# Patient Record
Sex: MALE | Race: WHITE | NOT HISPANIC OR LATINO | Employment: FULL TIME | URBAN - METROPOLITAN AREA
[De-identification: names, ages, dates, MRNs, and addresses within clinical notes are randomized per-mention and may not be internally consistent; named-entity substitution may affect disease eponyms.]

---

## 2024-08-02 ENCOUNTER — OFFICE VISIT (OUTPATIENT)
Dept: URGENT CARE | Facility: CLINIC | Age: 24
End: 2024-08-02
Payer: OTHER GOVERNMENT

## 2024-08-02 VITALS
OXYGEN SATURATION: 96 % | BODY MASS INDEX: 31.07 KG/M2 | RESPIRATION RATE: 18 BRPM | SYSTOLIC BLOOD PRESSURE: 113 MMHG | HEIGHT: 68 IN | DIASTOLIC BLOOD PRESSURE: 76 MMHG | TEMPERATURE: 99 F | HEART RATE: 96 BPM | WEIGHT: 205 LBS

## 2024-08-02 DIAGNOSIS — J02.9 SORE THROAT: ICD-10-CM

## 2024-08-02 DIAGNOSIS — H57.89 REDNESS OF RIGHT EYE: ICD-10-CM

## 2024-08-02 DIAGNOSIS — H10.32 ACUTE BACTERIAL CONJUNCTIVITIS OF LEFT EYE: Primary | ICD-10-CM

## 2024-08-02 PROBLEM — I49.8 OTHER SPECIFIED CARDIAC ARRHYTHMIAS: Status: ACTIVE | Noted: 2024-08-02

## 2024-08-02 PROBLEM — U07.1 DISEASE DUE TO SEVERE ACUTE RESPIRATORY SYNDROME CORONAVIRUS 2 (SARS-COV-2): Status: ACTIVE | Noted: 2023-12-20

## 2024-08-02 PROBLEM — G47.00 INSOMNIA: Status: ACTIVE | Noted: 2024-06-21

## 2024-08-02 PROBLEM — R07.81 PLEURODYNIA: Status: ACTIVE | Noted: 2024-04-03

## 2024-08-02 LAB
CTP QC/QA: YES
MOLECULAR STREP A: NEGATIVE

## 2024-08-02 RX ORDER — POLYMYXIN B SULFATE AND TRIMETHOPRIM 1; 10000 MG/ML; [USP'U]/ML
1 SOLUTION OPHTHALMIC EVERY 4 HOURS
Qty: 10 ML | Refills: 0 | Status: SHIPPED | OUTPATIENT
Start: 2024-08-02 | End: 2024-08-09

## 2024-08-02 RX ORDER — DOXYLAMINE SUCCINATE 25 MG/1
TABLET ORAL
COMMUNITY
Start: 2024-06-21

## 2024-08-02 RX ORDER — GUAIFENESIN 600 MG/1
TABLET, EXTENDED RELEASE ORAL
COMMUNITY
Start: 2023-12-20 | End: 2024-08-02

## 2024-08-02 RX ORDER — FLUTICASONE PROPIONATE 50 MCG
1 SPRAY, SUSPENSION (ML) NASAL DAILY
COMMUNITY

## 2024-08-02 RX ORDER — PSEUDOEPHEDRINE HCL 30 MG
TABLET ORAL
COMMUNITY
Start: 2023-12-20 | End: 2024-08-02

## 2024-08-02 RX ORDER — BENZONATATE 100 MG/1
CAPSULE ORAL
COMMUNITY
Start: 2023-12-20 | End: 2024-08-02

## 2024-08-02 NOTE — PROGRESS NOTES
"Subjective:      Patient ID: Luciano Whatley is a 23 y.o. male.    Vitals:  height is 5' 8" (1.727 m) and weight is 93 kg (205 lb). His oral temperature is 98.9 °F (37.2 °C). His blood pressure is 113/76 and his pulse is 96. His respiration is 18 and oxygen saturation is 96%.     Chief Complaint: Conjunctivitis    23 y.o male c/o right eye redness started today. Patient reports that he woke up with discharge in right eye and used a warm compass to relieve it.     23 year old female presents to clinic with complaints of left eye redness, eyelash matting and eye discharge x 1 day. Reports sore throat without nasal congestion, post nasal drip, + history of strep throat infecations.     Conjunctivitis  This is a new problem. The current episode started today. The problem occurs constantly. The problem has been rapidly improving. Associated symptoms include a sore throat. Pertinent negatives include no abdominal pain, anorexia, arthralgias, change in bowel habit, chills, congestion, fatigue, fever, headaches, joint swelling, myalgias, nausea, neck pain, rash, swollen glands, urinary symptoms, vertigo, visual change, vomiting or weakness. Nothing aggravates the symptoms. He has tried nothing for the symptoms. The treatment provided no relief.       Constitution: Negative for chills, fatigue and fever.   HENT:  Positive for sore throat. Negative for congestion and postnasal drip.    Neck: Negative for neck pain and painful lymph nodes.   Eyes:  Positive for eye discharge and eye redness. Negative for eye trauma, foreign body in eye, eye itching, eye pain, photophobia, vision loss, double vision, blurred vision and eyelid swelling.   Gastrointestinal:  Negative for abdominal pain, nausea and vomiting.   Musculoskeletal:  Negative for joint pain, joint swelling and muscle ache.   Skin:  Negative for rash.   Neurological:  Negative for history of vertigo and headaches.   Hematologic/Lymphatic: Negative for swollen lymph nodes. "      Objective:     Physical Exam   Constitutional: He is oriented to person, place, and time. He appears well-developed.   HENT:   Head: Normocephalic and atraumatic.   Ears:   Right Ear: External ear normal.   Left Ear: External ear normal.   Nose: Nose normal. No mucosal edema or rhinorrhea.   Mouth/Throat: Posterior oropharyngeal erythema present.   Eyes: EOM and lids are normal. Pupils are equal, round, and reactive to light. Right eye exhibits no discharge. Left eye exhibits discharge and exudate. Left eye exhibits no hordeolum. No foreign body present in the left eye. Right conjunctiva is not injected. Left conjunctiva is injected. Extraocular movement intact   Neck: Trachea normal and phonation normal. Neck supple.   Cardiovascular: Normal rate.   Pulmonary/Chest: Effort normal.   Musculoskeletal: Normal range of motion.         General: Normal range of motion.   Lymphadenopathy:        Head (right side): No submandibular and no tonsillar adenopathy present.        Head (left side): No submandibular and no tonsillar adenopathy present.   Neurological: He is alert and oriented to person, place, and time.   Skin: Skin is warm, dry and intact.   Psychiatric: His speech is normal and behavior is normal. Judgment and thought content normal.   Nursing note and vitals reviewed.      Assessment:     1. Acute bacterial conjunctivitis of left eye    2. Redness of right eye    3. Sore throat      Results for orders placed or performed in visit on 08/02/24   POCT Strep A, Molecular   Result Value Ref Range    Molecular Strep A, POC Negative Negative     Acceptable Yes       Plan:       Acute bacterial conjunctivitis of left eye  -     polymyxin B sulf-trimethoprim (POLYTRIM) 10,000 unit- 1 mg/mL Drop; Place 1 drop into the left eye every 4 (four) hours. for 7 days  Dispense: 10 mL; Refill: 0    Redness of right eye    Sore throat  -     POCT Strep A, Molecular      Patient Instructions   What is bacterial  "conjunctivitis?  This is a bacterial infection of the eyes. It is also called "pink eye." Several different types of bacteria can cause this conjunctivitis. It can start in one eye and spread to the other within 24 to 48 hours. It is often seen with other illnesses such as an ear infection, a sinus infection, or strep throat. Symptoms include crusts that form on the eyelids overnight, yellow or green discharge from one or both eyes and burning, irritation, tearing and redness in the eyes. This type of conjunctivitis is contagious.  Treatment can include: General comfort measures, antibiotic eye drops or eye ointment. It is important to continue the medicine for as long as it is prescribed, even if your symptoms get better. You may go back to work or school 24 hours after starting antibiotics    General comfort measures you can try:  1. Place a cool, moist cloth over eyes. Use a clean cloth or paper towel that has been soaked in cool distilled or bottled water and wrung out. Hold the cloth on your eyes up to four times a day. Use a new, clean cloth each time.  2. Try artificial tears eye drops to keep your eyes moist. Products are available over the counter at [employer]. Follow the package directions.    How can I prevent the spread of conjunctivitis?  1. If you wear contact lenses, stop wearing contacts during the infection. Throw away contact lenses that you are currently wearing and thoroughly clean your contact lens case. Do not start wearing contacts again until you have finished the medicine (if prescribed) and the symptoms have gone away.  2. Wash your hand often and keep them away from your eyes. Make sure to wash your hands before and after using any eye drops. When putting eye medicine in your eye, do not touch the tip of the dropper to your eye.  3. Machine wash anything that has touched the infected eye or eyes. This includes towels, wash cloths, pillow cases and tissues. These items are contaminated " with the bacteria or virus and can spread the infection to others in your household. (This is not necessary for allergic conjunctivitis).    What if I still don't feel better?    Follow-up with your primary care provider if your symptoms get worse or show no improvement after 3 to 5 days of starting treatment.

## 2024-08-02 NOTE — PATIENT INSTRUCTIONS
"What is bacterial conjunctivitis?  This is a bacterial infection of the eyes. It is also called "pink eye." Several different types of bacteria can cause this conjunctivitis. It can start in one eye and spread to the other within 24 to 48 hours. It is often seen with other illnesses such as an ear infection, a sinus infection, or strep throat. Symptoms include crusts that form on the eyelids overnight, yellow or green discharge from one or both eyes and burning, irritation, tearing and redness in the eyes. This type of conjunctivitis is contagious.  Treatment can include: General comfort measures, antibiotic eye drops or eye ointment. It is important to continue the medicine for as long as it is prescribed, even if your symptoms get better. You may go back to work or school 24 hours after starting antibiotics    General comfort measures you can try:  1. Place a cool, moist cloth over eyes. Use a clean cloth or paper towel that has been soaked in cool distilled or bottled water and wrung out. Hold the cloth on your eyes up to four times a day. Use a new, clean cloth each time.  2. Try artificial tears eye drops to keep your eyes moist. Products are available over the counter at [employer]. Follow the package directions.    How can I prevent the spread of conjunctivitis?  1. If you wear contact lenses, stop wearing contacts during the infection. Throw away contact lenses that you are currently wearing and thoroughly clean your contact lens case. Do not start wearing contacts again until you have finished the medicine (if prescribed) and the symptoms have gone away.  2. Wash your hand often and keep them away from your eyes. Make sure to wash your hands before and after using any eye drops. When putting eye medicine in your eye, do not touch the tip of the dropper to your eye.  3. Machine wash anything that has touched the infected eye or eyes. This includes towels, wash cloths, pillow cases and tissues. These items " are contaminated with the bacteria or virus and can spread the infection to others in your household. (This is not necessary for allergic conjunctivitis).    What if I still don't feel better?    Follow-up with your primary care provider if your symptoms get worse or show no improvement after 3 to 5 days of starting treatment.